# Patient Record
Sex: FEMALE | Race: WHITE | URBAN - METROPOLITAN AREA
[De-identification: names, ages, dates, MRNs, and addresses within clinical notes are randomized per-mention and may not be internally consistent; named-entity substitution may affect disease eponyms.]

---

## 2017-03-23 ENCOUNTER — TELEPHONE (OUTPATIENT)
Dept: FAMILY MEDICINE CLINIC | Age: 52
End: 2017-03-23

## 2017-03-27 ENCOUNTER — TELEPHONE (OUTPATIENT)
Dept: FAMILY MEDICINE CLINIC | Age: 52
End: 2017-03-27

## 2017-03-27 RX ORDER — NALTREXONE HYDROCHLORIDE 50 MG/1
1 TABLET, FILM COATED ORAL DAILY
Refills: 9 | COMMUNITY
Start: 2017-02-09 | End: 2017-03-28 | Stop reason: SDUPTHER

## 2017-03-28 ENCOUNTER — OFFICE VISIT (OUTPATIENT)
Dept: FAMILY MEDICINE CLINIC | Age: 52
End: 2017-03-28

## 2017-03-28 VITALS — WEIGHT: 168.4 LBS | BODY MASS INDEX: 26.77 KG/M2 | TEMPERATURE: 97.8 F

## 2017-03-28 DIAGNOSIS — Z23 NEED FOR TDAP VACCINATION: ICD-10-CM

## 2017-03-28 DIAGNOSIS — Z71.84 COUNSELING ABOUT TRAVEL: ICD-10-CM

## 2017-03-28 DIAGNOSIS — E55.9 VITAMIN D DEFICIENCY: Primary | ICD-10-CM

## 2017-03-28 DIAGNOSIS — Z23 NEED FOR HEPATITIS A IMMUNIZATION: ICD-10-CM

## 2017-03-28 PROCEDURE — 90471 IMMUNIZATION ADMIN: CPT | Performed by: FAMILY MEDICINE

## 2017-03-28 PROCEDURE — 90472 IMMUNIZATION ADMIN EACH ADD: CPT | Performed by: FAMILY MEDICINE

## 2017-03-28 PROCEDURE — 90715 TDAP VACCINE 7 YRS/> IM: CPT | Performed by: FAMILY MEDICINE

## 2017-03-28 PROCEDURE — 99213 OFFICE O/P EST LOW 20 MIN: CPT | Performed by: FAMILY MEDICINE

## 2017-03-28 PROCEDURE — 90632 HEPA VACCINE ADULT IM: CPT | Performed by: FAMILY MEDICINE

## 2017-03-28 RX ORDER — OSELTAMIVIR PHOSPHATE 75 MG/1
75 CAPSULE ORAL 2 TIMES DAILY
Qty: 10 CAPSULE | Refills: 0 | Status: SHIPPED | OUTPATIENT
Start: 2017-03-28 | End: 2017-04-02

## 2017-03-28 RX ORDER — CIPROFLOXACIN 500 MG/1
500 TABLET, FILM COATED ORAL 2 TIMES DAILY
Qty: 14 TABLET | Refills: 0 | Status: SHIPPED | OUTPATIENT
Start: 2017-03-28 | End: 2017-04-04

## 2017-03-28 RX ORDER — ZOLPIDEM TARTRATE 10 MG/1
10 TABLET ORAL NIGHTLY PRN
Qty: 14 TABLET | Refills: 0 | Status: SHIPPED | OUTPATIENT
Start: 2017-03-28 | End: 2017-08-03 | Stop reason: ALTCHOICE

## 2017-03-28 RX ORDER — DIPHENOXYLATE HYDROCHLORIDE AND ATROPINE SULFATE 2.5; .025 MG/1; MG/1
1 TABLET ORAL 4 TIMES DAILY PRN
Qty: 30 TABLET | Refills: 0 | Status: SHIPPED | OUTPATIENT
Start: 2017-03-28 | End: 2017-08-03 | Stop reason: ALTCHOICE

## 2017-03-28 RX ORDER — ZOLPIDEM TARTRATE 10 MG/1
10 TABLET ORAL NIGHTLY PRN
Qty: 14 TABLET | Refills: 0 | Status: SHIPPED | OUTPATIENT
Start: 2017-03-28 | End: 2017-03-28 | Stop reason: SDUPTHER

## 2017-03-28 ASSESSMENT — ENCOUNTER SYMPTOMS
RESPIRATORY NEGATIVE: 1
GASTROINTESTINAL NEGATIVE: 1
EYES NEGATIVE: 1

## 2017-06-27 ENCOUNTER — TELEPHONE (OUTPATIENT)
Dept: FAMILY MEDICINE CLINIC | Age: 52
End: 2017-06-27

## 2017-06-27 DIAGNOSIS — S86.899A SHIN SPLINTS, UNSPECIFIED LATERALITY, INITIAL ENCOUNTER: Primary | ICD-10-CM

## 2017-08-03 ENCOUNTER — OFFICE VISIT (OUTPATIENT)
Dept: FAMILY MEDICINE CLINIC | Age: 52
End: 2017-08-03

## 2017-08-03 VITALS
HEIGHT: 67 IN | WEIGHT: 174.8 LBS | HEART RATE: 81 BPM | DIASTOLIC BLOOD PRESSURE: 80 MMHG | OXYGEN SATURATION: 100 % | TEMPERATURE: 98 F | SYSTOLIC BLOOD PRESSURE: 110 MMHG | BODY MASS INDEX: 27.44 KG/M2

## 2017-08-03 DIAGNOSIS — L57.0 AK (ACTINIC KERATOSIS): Primary | ICD-10-CM

## 2017-08-03 DIAGNOSIS — R20.8 OTHER DISTURBANCES OF SKIN SENSATION: ICD-10-CM

## 2017-08-03 PROCEDURE — 99213 OFFICE O/P EST LOW 20 MIN: CPT | Performed by: NURSE PRACTITIONER

## 2017-08-03 PROCEDURE — 17110 DESTRUCTION B9 LES UP TO 14: CPT | Performed by: NURSE PRACTITIONER

## 2017-08-03 RX ORDER — ESTRADIOL AND NORETHINDRONE ACETATE 1; .5 MG/1; MG/1
1 TABLET ORAL
COMMUNITY
Start: 2017-05-04 | End: 2017-08-22 | Stop reason: ALTCHOICE

## 2017-08-03 ASSESSMENT — ENCOUNTER SYMPTOMS
EYES NEGATIVE: 1
GASTROINTESTINAL NEGATIVE: 1
ALLERGIC/IMMUNOLOGIC NEGATIVE: 1
RESPIRATORY NEGATIVE: 1

## 2017-08-22 ENCOUNTER — HOSPITAL ENCOUNTER (OUTPATIENT)
Dept: OTHER | Age: 52
Discharge: HOME | End: 2017-08-22
Attending: FAMILY MEDICINE | Admitting: FAMILY MEDICINE

## 2017-08-22 ENCOUNTER — OFFICE VISIT (OUTPATIENT)
Dept: FAMILY MEDICINE CLINIC | Age: 52
End: 2017-08-22

## 2017-08-22 VITALS
BODY MASS INDEX: 27.5 KG/M2 | TEMPERATURE: 97.8 F | SYSTOLIC BLOOD PRESSURE: 106 MMHG | WEIGHT: 175.2 LBS | HEIGHT: 67 IN | DIASTOLIC BLOOD PRESSURE: 74 MMHG

## 2017-08-22 DIAGNOSIS — S90.121A CONTUSION OF LESSER TOE OF RIGHT FOOT WITHOUT DAMAGE TO NAIL, INITIAL ENCOUNTER: ICD-10-CM

## 2017-08-22 DIAGNOSIS — S90.121A CONTUSION OF LESSER TOE OF RIGHT FOOT WITHOUT DAMAGE TO NAIL, INITIAL ENCOUNTER: Primary | ICD-10-CM

## 2017-08-22 PROCEDURE — 99213 OFFICE O/P EST LOW 20 MIN: CPT | Performed by: FAMILY MEDICINE

## 2017-08-22 RX ORDER — ESTRADIOL AND NORETHINDRONE ACETATE .5; .1 MG/1; MG/1
TABLET ORAL
COMMUNITY

## 2017-08-22 ASSESSMENT — ENCOUNTER SYMPTOMS
EYES NEGATIVE: 1
RESPIRATORY NEGATIVE: 1
GASTROINTESTINAL NEGATIVE: 1

## 2017-09-15 ENCOUNTER — TELEPHONE (OUTPATIENT)
Dept: FAMILY MEDICINE CLINIC | Age: 52
End: 2017-09-15

## 2017-10-03 ENCOUNTER — TELEPHONE (OUTPATIENT)
Dept: FAMILY MEDICINE CLINIC | Age: 52
End: 2017-10-03

## 2018-06-28 ENCOUNTER — TELEPHONE (OUTPATIENT)
Dept: DERMATOLOGY | Age: 53
End: 2018-06-28

## 2018-07-26 ENCOUNTER — OFFICE VISIT (OUTPATIENT)
Dept: DERMATOLOGY | Age: 53
End: 2018-07-26

## 2018-07-26 DIAGNOSIS — L57.0 AK (ACTINIC KERATOSIS): ICD-10-CM

## 2018-07-26 DIAGNOSIS — B36.0 TINEA VERSICOLOR: ICD-10-CM

## 2018-07-26 DIAGNOSIS — D48.5 NEOPLASM OF UNCERTAIN BEHAVIOR OF SKIN: Primary | ICD-10-CM

## 2018-07-26 PROCEDURE — 11100 PR BIOPSY OF SKIN LESION: CPT | Performed by: DERMATOLOGY

## 2018-07-26 PROCEDURE — 99202 OFFICE O/P NEW SF 15 MIN: CPT | Performed by: DERMATOLOGY

## 2018-07-26 PROCEDURE — 11101 PR BIOPSY, EACH ADDED LESION: CPT | Performed by: DERMATOLOGY

## 2018-07-26 RX ORDER — KETOCONAZOLE 20 MG/ML
SHAMPOO TOPICAL
Qty: 1 BOTTLE | Refills: 3 | Status: SHIPPED | OUTPATIENT
Start: 2018-07-26 | End: 2021-05-06

## 2018-07-26 NOTE — PROGRESS NOTES
[facility]  Zander Caceres MD  [phone]      [facility]  1965    45 y.o. female     Date of Visit: 7/26/2018    Chief Complaint: skin lesions, rash    History of Present Illness:    1. She presents today for a persistent pink spot on the right lower cheek - present for few years. Treated with cryotherapy in the past.      2.  She few asymptomatic scaly lesions on the face. 3   She complains of discoloration on the back.  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    Plays tennis, runs. Review of Systems:  Skin: No new or changing moles. Past Medical History, Family History, Surgical History, Medications and Allergies reviewed. Past Medical History:   Diagnosis Date    Chicken pox     Iritis     Multiple sclerosis ([facility] [address]     Pap smear for cervical cancer screening 05/04/2017    Screening mammogram, encounter for 01/17/2017     Past Surgical History:   Procedure Laterality Date    WISDOM TOOTH EXTRACTION         No Known Allergies  Outpatient Prescriptions Marked as Taking for the 7/26/18 encounter (Office Visit) with Gala Galvan MD   Medication Sig Dispense Refill    ketoconazole (NIZORAL) 2 % shampoo Use to wash the back once weekly. Leave on skin for 5 minutes prior to rinsing. 1 Bottle 3    NALTREXONE HCL PO Take 4 mg by mouth           Physical Examination     She declines a full skin exam.     The following were examined and determined to be normal: Psych/Neuro, Conjunctivae/eyelids, Gums/teeth/lips, Neck, RUE and LUE. The following were examined and determined to be abnormal: Head/face and Back. Well-appearing. 1.  A.  Right lower cheek with a 1 cm telangiectatic pearly pink plaque. B. Left lateral cheek with a 5 mm telangiectatic pearly papule. 2.  Central forehead and right medial cheek with 2 skin colored to faintly pink slightly scaly macules. 3.  Back with mildly scaly hypopigmented patches.         Assessment

## 2018-07-31 ENCOUNTER — TELEPHONE (OUTPATIENT)
Dept: DERMATOLOGY | Age: 53
End: 2018-07-31

## 2018-07-31 DIAGNOSIS — C44.319 BASAL CELL CARCINOMA OF CHEEK: Primary | ICD-10-CM

## 2018-07-31 NOTE — TELEPHONE ENCOUNTER
Reviewed results of the biopsy with the patient. Date of biopsy: 7/26/18  Site of biopsy: R lower cheek  Result: NBCC    Plan: MOHS with Dr. Merry Heath    Date of biopsy: 7/26/18  Site of biopsy: L lateral cheek  Result: NBCC    Plan: MOHS with Dr. Merry Heath    The patient expressed understanding of the plan.

## 2018-08-20 ENCOUNTER — PROCEDURE VISIT (OUTPATIENT)
Dept: SURGERY | Age: 53
End: 2018-08-20

## 2018-08-20 VITALS
DIASTOLIC BLOOD PRESSURE: 73 MMHG | TEMPERATURE: 98.3 F | OXYGEN SATURATION: 97 % | SYSTOLIC BLOOD PRESSURE: 124 MMHG | BODY MASS INDEX: 27.25 KG/M2 | HEART RATE: 64 BPM | WEIGHT: 174 LBS

## 2018-08-20 DIAGNOSIS — C44.319 BASAL CELL CARCINOMA OF LEFT CHEEK: ICD-10-CM

## 2018-08-20 DIAGNOSIS — C44.319 BASAL CELL CARCINOMA OF RIGHT CHEEK: Primary | ICD-10-CM

## 2018-08-20 PROCEDURE — 13132 CMPLX RPR F/C/C/M/N/AX/G/H/F: CPT | Performed by: DERMATOLOGY

## 2018-08-20 PROCEDURE — 17311 MOHS 1 STAGE H/N/HF/G: CPT | Performed by: DERMATOLOGY

## 2018-08-20 PROCEDURE — 12051 INTMD RPR FACE/MM 2.5 CM/<: CPT | Performed by: DERMATOLOGY

## 2018-08-20 PROCEDURE — 17312 MOHS ADDL STAGE: CPT | Performed by: DERMATOLOGY

## 2018-08-20 NOTE — PROGRESS NOTES
MOHS PROCEDURE NOTE    PHYSICIAN:  Palmer Garcia. Efrem Monroe MD    ASSISTANT: Domenico Gaston RN and Gunner Marques[address]     REFERRING PROVIDER:   Huma Quinn MD    PREOPERATIVE DIAGNOSIS: Nodular Basal Cell Carcinoma     SPECIFIC MOHS INDICATIONS:  location    [facility] SCORIN/9    POSTOPERATIVE DIAGNOSIS: SAME    LOCATION: Left lateral cheek    OPERATIVE PROCEDURE:  MOHS MICROGRAPHIC SURGERY    RECONSTRUCTION OF DEFECT: Intermediate layered closure    PREOPERATIVE SIZE: 6x5 MM    DEFECT SIZE: 21x9 MM    LENGTH OF REPAIRED WOUND/SIZE OF FLAP/SIZE OF GRAFT:  24 MM    ANESTHESIA:  8mL 1% lidocaine with epinephrine 1:100,000 buffered. EBL:  MINIMAL    DURATION OF PROCEDURE:  2 HOURS    POSTOPERATIVE OBSERVATION: 1 HOUR    SPECIMENS:  SEE MOHS MAP    COMPLICATIONS:  NONE    DESCRIPTION OF PROCEDURE:  The patient was given a mirror, as appropriate, and the biopsy site was identified, marked with a surgical marking pen, and verified by the patient. Options for treatment were discussed and the patient was informed that Mohs surgery was the selected treatment based on its lower recurrence rate, given the features listed above, as compared to other treatment modalities such as excision, radiation, or curettage, and agreed with this treatment plan. Risks and benefits including bruising, swelling, bleeding, infection, nerve injury, recurrence, and scarring were discussed with the patient prior to the procedure and a written consent detailing these and other risks was reviewed with the patient and signed. There was a time out for person and procedure verification. The surgical site was prepped with an antiseptic solution. Application of an antiseptic solution was repeated before each surgical stage. Stage I:  The clinically-apparent tumor was carefully defined and debulked, determining the edge of the surgical excision.     A thin layer of tumor-laden tissue was excised with a narrow margin of normal-appearing skin, using the technique of Mohs. A map was prepared to correspond to the area of skin from which it was excised. Hemostasis was achieved using electrosurgery. The wound was bandaged. The tissue was prepared for the cryostat and sectioned. 1 section(s) prepared. Each section was coded, cut, and stained for microscopic examination. The entire base and margins of the excised piece of tissue were examined by the surgeon. No tumor was identified at the peripheral margins of stage I of microscopically controlled surgery. DEFECT MANAGEMENT:    REPAIR DESCRIPTION:  Various closure modalities were discussed with the patient, and it was decided that an intermediate layered repair would best preserve normal anatomic and functional relationships. Additional risk of wound dehiscence was discussed. The area was anesthetized with 1% lidocaine with epinephrine 1:100,000 buffered, was given a sterile prep using Chlorhexidine gluconate 4% solution and draped in the usual sterile fashion. Recreation and enlargement of the wound was performed by excising cones of tissue via the triangulation technique. The final incision lines were placed with respect for the patient's natural skin tension lines in a linear configuration to avoid functional and aesthetic distortion of adjacent free margins. Following minimal undermining, meticulous hemostasis was obtained with spot monopolar electrocoagulation. Subcutaneous dead space and dermis were closed using 5-0 Vicryl buried subcutaneous interrupted suture and the epidermis was approximated with 6-0 Fast absorbing gut running epidermal sutures. WOUND COVERAGE:  The wound was cleansed with normal saline and dried. Liquid skin adhesive was applied for wound protection and additional epidermal adhesion. The patient was given detailed oral and written information on post-operative care. There were no complications.   The patient left the Unit in good medical condition. FOLLOW-UP:  As dissolving sutures were placed, the patient was asked to return if any questions or concerns arose, but otherwise will return to see general dermatology per their instructions.

## 2018-08-20 NOTE — PATIENT INSTRUCTIONS
[facility] Office Hours:    Monday-Thursday  7:30 AM-4:30 PM    Friday  9:00 AM-3:00 PM    The patient was given post operative care instructions Liquid skin adhesive. All questions were reviewed.

## 2018-08-20 NOTE — PROGRESS NOTES
MOHS PROCEDURE NOTE    PHYSICIAN:  Moreno Dodge. Susi Cee MD    ASSISTANT: Jeramy Mcmillan RN and Sukumar Weaver     REFERRING PROVIDER:   Lacy Swift MD    PREOPERATIVE DIAGNOSIS: Nodular Basal Cell Carcinoma     SPECIFIC MOHS INDICATIONS:  location    [facility] SCORIN/9    POSTOPERATIVE DIAGNOSIS: SAME    LOCATION: Right lower cheek    OPERATIVE PROCEDURE:  MOHS MICROGRAPHIC SURGERY    RECONSTRUCTION OF DEFECT: Complex layered closure    PREOPERATIVE SIZE: 18x9 MM    DEFECT SIZE: 21x20 MM    LENGTH OF REPAIRED WOUND/SIZE OF FLAP/SIZE OF GRAFT:  47 MM    ANESTHESIA:  9.5mL 1% lidocaine with epinephrine 1:100,000 buffered. EBL:  MINIMAL    DURATION OF PROCEDURE:  2 HOURS    POSTOPERATIVE OBSERVATION: 1 HOUR    SPECIMENS:  SEE MOHS MAP    COMPLICATIONS:  NONE    DESCRIPTION OF PROCEDURE:  The patient was given a mirror, as appropriate, and the biopsy site was identified, marked with a surgical marking pen, and verified by the patient. Options for treatment were discussed and the patient was informed that Mohs surgery was the selected treatment based on its lower recurrence rate, given the features listed above, as compared to other treatment modalities such as excision, radiation, or curettage, and agreed with this treatment plan. Risks and benefits including bruising, swelling, bleeding, infection, nerve injury, recurrence, and scarring were discussed with the patient prior to the procedure and a written consent detailing these and other risks was reviewed with the patient and signed. There was a time out for person and procedure verification. The surgical site was prepped with an antiseptic solution. Application of an antiseptic solution was repeated before each surgical stage. Stage I:  The clinically-apparent tumor was carefully defined and debulked, determining the edge of the surgical excision.     A thin layer of tumor-laden tissue was excised with a narrow margin of normal-appearing skin,

## 2018-08-21 ENCOUNTER — TELEPHONE (OUTPATIENT)
Dept: SURGERY | Age: 53
End: 2018-08-21

## 2018-08-27 ENCOUNTER — TELEPHONE (OUTPATIENT)
Dept: SURGERY | Age: 53
End: 2018-08-27

## 2018-08-27 NOTE — TELEPHONE ENCOUNTER
Spoke with patient and she wanted to discuss what to do after her one week post Mohs on her right cheek. Reviewed that she may clean the area gently and the glue will begin to dissolve. She questions the elevation of the scar and discussed massaging after 2 weeks post Mohs. She stated she is flying next week and if there are any precautions. Informed her there are none but she stated she will feel better wearing a bandage. No indication of infection. No further concerns at this moment.

## 2019-07-08 ENCOUNTER — OFFICE VISIT (OUTPATIENT)
Dept: DERMATOLOGY | Age: 54
End: 2019-07-08
Payer: COMMERCIAL

## 2019-07-08 DIAGNOSIS — D48.5 NEOPLASM OF UNCERTAIN BEHAVIOR OF SKIN: Primary | ICD-10-CM

## 2019-07-08 DIAGNOSIS — L57.0 AK (ACTINIC KERATOSIS): ICD-10-CM

## 2019-07-08 PROCEDURE — 11102 TANGNTL BX SKIN SINGLE LES: CPT | Performed by: DERMATOLOGY

## 2019-07-08 PROCEDURE — 17000 DESTRUCT PREMALG LESION: CPT | Performed by: DERMATOLOGY

## 2019-07-08 PROCEDURE — 11103 TANGNTL BX SKIN EA SEP/ADDL: CPT | Performed by: DERMATOLOGY

## 2019-07-08 NOTE — PROGRESS NOTES
and left cheek - few scaly skin colored to pink macules. Assessment and Plan     1. Neoplasm of uncertain behavior of skin,   A. Left inferolateral cheek - r/o BCC  B. Right upper back - r/o BCC    Discussed possible diagnosis; patient agreeable to biopsies (verbal consent obtained). The area(s) to be biopsied were marked with a surgical pen. Alcohol was used to cleanse the site. Local anesthesia was acheived with 1% lidocaine with epinephrine. Shave biopsy was performed x 2 using a razor blade. Hemostasis was achieved with aluminum chloride. The wound(s) were dressed with petrolatum and covered with a bandage. Wound care instructions were reviewed. 2 Specimen (s) sent to pathology. The specimen bottles were appropriately labeled. We also reviewed the risks of bleeding, scar, and infection. 2. AK (actinic keratosis) -     Observe small asymptomatic nasal lesion-discussed low premalignant potential.  2 cycles of liquid nitrogen applied to 1 AK on the left malar cheek. Patient was educated regarding the potential risks of blister formation, discomfort, hypopigmentation, and scar. Wound care was discussed. Return in about 6 months (around 1/8/2020).

## 2019-07-11 DIAGNOSIS — C44.319 BASAL CELL CARCINOMA (BCC) OF LEFT CHEEK: Primary | ICD-10-CM

## 2019-07-11 LAB — DERMATOLOGY PATHOLOGY REPORT: ABNORMAL

## 2019-07-19 ENCOUNTER — PROCEDURE VISIT (OUTPATIENT)
Dept: DERMATOLOGY | Age: 54
End: 2019-07-19
Payer: COMMERCIAL

## 2019-07-19 DIAGNOSIS — C44.519 BCC (BASAL CELL CARCINOMA), TRUNK: Primary | ICD-10-CM

## 2019-07-19 PROCEDURE — 17262 DSTRJ MAL LES T/A/L 1.1-2.0: CPT | Performed by: DERMATOLOGY

## 2019-08-13 ENCOUNTER — PROCEDURE VISIT (OUTPATIENT)
Dept: SURGERY | Age: 54
End: 2019-08-13
Payer: COMMERCIAL

## 2019-08-13 VITALS
BODY MASS INDEX: 26.94 KG/M2 | HEART RATE: 67 BPM | SYSTOLIC BLOOD PRESSURE: 90 MMHG | WEIGHT: 172 LBS | DIASTOLIC BLOOD PRESSURE: 51 MMHG | TEMPERATURE: 98.6 F | OXYGEN SATURATION: 98 %

## 2019-08-13 DIAGNOSIS — C44.319 BASAL CELL CARCINOMA OF LEFT CHEEK: Primary | ICD-10-CM

## 2019-08-13 PROCEDURE — 12052 INTMD RPR FACE/MM 2.6-5.0 CM: CPT | Performed by: DERMATOLOGY

## 2019-08-13 PROCEDURE — 17311 MOHS 1 STAGE H/N/HF/G: CPT | Performed by: DERMATOLOGY

## 2019-08-13 NOTE — PROGRESS NOTES
MOHS PROCEDURE NOTE    PHYSICIAN:  Jackie Mari. Becca Vaughan MD    ASSISTANT: Michelle Nassar MA and Nisa Washington RN     REFERRING PROVIDER:   Miguel Magallanes MD    PREOPERATIVE DIAGNOSIS: Nodular Basal Cell Carcinoma     SPECIFIC MOHS INDICATIONS:  location    [facility] SCORIN/9    POSTOPERATIVE DIAGNOSIS: SAME    LOCATION: Left inferior lateral cheek    OPERATIVE PROCEDURE:  MOHS MICROGRAPHIC SURGERY    RECONSTRUCTION OF DEFECT: Intermediate layered closure    PREOPERATIVE SIZE: 13x6 MM    DEFECT SIZE: 16x10 MM    LENGTH OF REPAIRED WOUND/SIZE OF FLAP/SIZE OF GRAFT:  33 MM    ANESTHESIA:  8mL 1% lidocaine with epinephrine 1:100,000 buffered. EBL:  MINIMAL    DURATION OF PROCEDURE:  1 HOURS    POSTOPERATIVE OBSERVATION: 1 HOUR    SPECIMENS:  SEE MOHS MAP    COMPLICATIONS:  NONE    DESCRIPTION OF PROCEDURE:  The patient was given a mirror, as appropriate, and the biopsy site was identified, marked with a surgical marking pen, and verified by the patient. Options for treatment were discussed and the patient was informed that Mohs surgery was the selected treatment based on its lower recurrence rate, given the features listed above, as compared to other treatment modalities such as excision, radiation, or curettage, and agreed with this treatment plan. Risks and benefits including bruising, swelling, bleeding, infection, nerve injury, recurrence, and scarring were discussed with the patient prior to the procedure and a written consent detailing these and other risks was reviewed with the patient and signed. There was a time out for person and procedure verification. The surgical site was prepped with an antiseptic solution. Application of an antiseptic solution was repeated before each surgical stage. Stage I:  The clinically-apparent tumor was carefully defined and debulked, determining the edge of the surgical excision.     A thin layer of tumor-laden tissue was excised with a narrow margin of normal-appearing

## 2019-08-13 NOTE — PATIENT INSTRUCTIONS
Excedrin) for 48 hours  after your procedure. Bleeding: If bleeding occurs, DO NOT remove the bandage. Put firm pressure on the area with gauze for 20 minutes without peeking. If the bleeding continue, apply pressure for another 20 minutes. If the bleeding does not stop after you apply pressure, call us right away. If you can not call, go to the nearest emergency room or urgent care facility. What to expect:  You may have these symptoms. They are normal and should get better with time:  1. Swelling. Swelling usually increases for the first 48 hours after your procedure and then begins to improve. Some soreness and redness around your wound. If we worked close to you eyes  (forehead, nose, temple, or upper cheeks) your eyes may become swollen and/ or black and blue. 2. Bruising, which could last 1 week or more. 3. Pink and bumpy appearance to the scar. This may happen a few weeks after your procedure. After 4 weeks, you may gently massage the area each day with facial moisturizer or petroleum jelly (Vaseline or Aquaphor). This will help to smooth the skin and improve the appearance of the scar. The color of your scar will fade over time, but may be pink for several months after the procedure. The scar may take 6 months to 1 year to reach its final color and appearance. 4. \"Spitting\" suture. Occasionally, an inside suture (stitch) does not completely dissolve. When this happens, (generally 4-8 weeks after surgery), it causes a bump or \"pimple\" to form on the scar. This is easily removed and is not at all serious. It does not mean the skin cancer has returned. Contact us if it happens, but do not be alarmed. Vitamin E oil is NOT necessary. A good moisturizer is just as effective. Sunscreen IS necessary.  Use at least and SPF 30 sunscreen daily- even in winter    Call us at [phone] right away if you have any of the following symptoms:  -Bleeding that you can not stop (see highlighted area above)

## 2019-08-14 ENCOUNTER — TELEPHONE (OUTPATIENT)
Dept: SURGERY | Age: 54
End: 2019-08-14

## 2020-11-04 ENCOUNTER — OFFICE VISIT (OUTPATIENT)
Dept: DERMATOLOGY | Age: 55
End: 2020-11-04
Payer: COMMERCIAL

## 2020-11-04 VITALS — TEMPERATURE: 97.5 F

## 2020-11-04 PROCEDURE — 3017F COLORECTAL CA SCREEN DOC REV: CPT | Performed by: DERMATOLOGY

## 2020-11-04 PROCEDURE — G8484 FLU IMMUNIZE NO ADMIN: HCPCS | Performed by: DERMATOLOGY

## 2020-11-04 PROCEDURE — G8427 DOCREV CUR MEDS BY ELIG CLIN: HCPCS | Performed by: DERMATOLOGY

## 2020-11-04 PROCEDURE — G8421 BMI NOT CALCULATED: HCPCS | Performed by: DERMATOLOGY

## 2020-11-04 PROCEDURE — 99213 OFFICE O/P EST LOW 20 MIN: CPT | Performed by: DERMATOLOGY

## 2020-11-04 PROCEDURE — 1036F TOBACCO NON-USER: CPT | Performed by: DERMATOLOGY

## 2020-11-04 NOTE — PROGRESS NOTES
[facility]ermat[facility]  Shahzad Espinal MD  [phone]      Sharad Mendez  1965    60 y.o. female     Date of Visit: 11/4/2020    Chief Complaint: skin lesions    History of Present Illness:    1. She has several asymptomatic pigmented lesions on the cheeks. 2.  F/u for a hx of BCCs - denies any signs of recurrence. Nodular BCC of the left inferior lateral cheek-treated with Mohs by Dr. Guido Segovia on 8/13/2019. Superficial BCC on the right upper back-treated with curettage on 7/19/2019. Nodular BCC on the left lateral cheek-treated with Mohs by Dr. Guido Segovia on 8/20/2018. Nodular BCC on the right lower cheek-treated with Mohs by Dr. Guido Segovia on 8/20/2018.      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     Review of Systems:  Skin: No new or changing moles. Past Medical History, Family History, Surgical History, Medications and Allergies reviewed. Past Medical History:   Diagnosis Date    Chicken pox     Iritis     Multiple sclerosis ([facility] [address]     Pap smear for cervical cancer screening 05/04/2017    Screening mammogram, encounter for 01/17/2017     Past Surgical History:   Procedure Laterality Date    MOHS SURGERY  08/20/2018    R lower cheek and L lateral cheek    WISDOM TOOTH EXTRACTION         No Known Allergies  Outpatient Medications Marked as Taking for the 11/4/20 encounter (Office Visit) with Casi Mcintosh MD   Medication Sig Dispense Refill    Estradiol-Norethindrone Acet (ACTIVELLA) 0.5-0.1 MG TABS Take by mouth      NALTREXONE HCL PO Take 4 mg by mouth         Physical Examination       The following were examined and determined to be normal: Psych/Neuro, Scalp/hair, Head/face, Conjunctivae/eyelids, Gums/teeth/lips, Neck, Breast/axilla/chest, Abdomen, Back, RUE and LUE. The following were examined and determined to be abnormal: None. Well appearing. 1.  Lateral portions of the face - stuck on appearing waxy appearing brown papules. 2.  Clear. Assessment and Plan     1. SK (seborrheic keratosis)     Reassurance. 2. History of basal cell carcinomas - clear    Sun protective behaviors and self skin examinations were encouraged. Call for any new or concerning lesions. Return in about 6 months (around 5/4/2021).     --Casi Mcintosh MD

## 2021-05-06 ENCOUNTER — OFFICE VISIT (OUTPATIENT)
Dept: DERMATOLOGY | Age: 56
End: 2021-05-06
Payer: COMMERCIAL

## 2021-05-06 VITALS — TEMPERATURE: 97.3 F

## 2021-05-06 DIAGNOSIS — L82.1 SK (SEBORRHEIC KERATOSIS): Primary | ICD-10-CM

## 2021-05-06 DIAGNOSIS — L81.4 SOLAR LENTIGO: ICD-10-CM

## 2021-05-06 DIAGNOSIS — Z85.828 HISTORY OF BASAL CELL CARCINOMA: ICD-10-CM

## 2021-05-06 DIAGNOSIS — D22.9 MULTIPLE NEVI: ICD-10-CM

## 2021-05-06 PROCEDURE — G8427 DOCREV CUR MEDS BY ELIG CLIN: HCPCS | Performed by: DERMATOLOGY

## 2021-05-06 PROCEDURE — G8421 BMI NOT CALCULATED: HCPCS | Performed by: DERMATOLOGY

## 2021-05-06 PROCEDURE — 3017F COLORECTAL CA SCREEN DOC REV: CPT | Performed by: DERMATOLOGY

## 2021-05-06 PROCEDURE — 99213 OFFICE O/P EST LOW 20 MIN: CPT | Performed by: DERMATOLOGY

## 2021-05-06 PROCEDURE — 1036F TOBACCO NON-USER: CPT | Performed by: DERMATOLOGY

## 2021-05-06 NOTE — PROGRESS NOTES
[facility]  Jennifer Brunson MD  [phone]      Verner Erb  1965    60 y.o. female     Date of Visit: 5/6/2021    Chief Complaint: skin lesions    History of Present Illness:    1. She has several stable pigmented lesions on the lateral portions of her cheeks. 2.  She also has multiple freckles on the chest and extremities. She is not aware of any concerning changes. 3.  She has multiple moles on the trunk and extremities-not aware of any changes in size, color, or shape. 4.  She has a history of multiple basal cell carcinomas-denies any signs of recurrence. Nodular BCC of the left inferior lateral cheek-treated with Mohs by Dr. Suzy Buitrago on 8/13/2019. Superficial BCC on the right upper back-treated with curettage on 7/19/2019. Nodular BCC on the left lateral cheek-treated with Mohs by Dr. Suzy Buitrago on 8/20/2018. Nodular BCC on the right lower cheek-treated with Mohs by Dr. Suzy Buitrago on 8/20/2018.      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       Review of Systems:  Gen: Feels well, good sense of health. Past Medical History, Family History, Surgical History, Medications and Allergies reviewed.     Past Medical History:   Diagnosis Date    Chicken pox     Iritis     Multiple sclerosis ([facility] [address]     Pap smear for cervical cancer screening 05/04/2017    Screening mammogram, encounter for 01/17/2017     Past Surgical History:   Procedure Laterality Date    MOHS SURGERY  08/20/2018    R lower cheek and L lateral cheek    WISDOM TOOTH EXTRACTION         No Known Allergies  Outpatient Medications Marked as Taking for the 5/6/21 encounter (Office Visit) with Jessica Ackerman MD   Medication Sig Dispense Refill    NALTREXONE HCL PO Take 4 mg by mouth           Physical Examination       The following were examined and determined to be normal: Psych/Neuro, Scalp/hair, Head/face, Conjunctivae/eyelids, Gums/teeth/lips, Neck, Breast/axilla/chest, Abdomen, Back, RUE, LUE, RLE, LLE and Nails/digits. The following were examined and determined to be abnormal: None. Well appearing. 1.  Lateral cheeks with few waxy brown papules. 2.  Chest and extremities with multiple smooth brown macules and patches. 3.  Trunk and extremities with multiple well defined round to oval smooth brown macules and papules. 4.  Clear. Assessment and Plan     1. SK (seborrheic keratosis)    Reassurance. 2. Solar lentigines    Monitor for change. Sun protective behaviors, including use of at least SPF 30 sunscreen, and self skin examinations were encouraged. Call for any new or concerning lesions. 3. Multiple nevi - benign appearing    Sun protective behaviors, including use of at least SPF 30 sunscreen, and self skin examinations were encouraged. Call for any new or concerning lesions. 4. History of basal cell carcinomas - clear    Sun protective behaviors, including use of at least SPF 30 sunscreen, and self skin examinations were encouraged. Call for any new or concerning lesions. Return in about 1 year (around 5/6/2022).     --Namrata Torre MD

## 2022-06-07 ENCOUNTER — TELEPHONE (OUTPATIENT)
Dept: DERMATOLOGY | Age: 57
End: 2022-06-07

## 2022-06-07 NOTE — TELEPHONE ENCOUNTER
Patient's  was in for an appointment with Dr Gricelda Ortega and said patient was in need of an appointment for a yearly skin check and a toenail issue. Called and left message for patient to call back office. Please schedule for next available.

## 2024-07-03 ENCOUNTER — OFFICE VISIT (OUTPATIENT)
Dept: DERMATOLOGY | Age: 59
End: 2024-07-03
Payer: COMMERCIAL

## 2024-07-03 DIAGNOSIS — L57.0 ACTINIC KERATOSIS: Primary | ICD-10-CM

## 2024-07-03 DIAGNOSIS — D48.5 NEOPLASM OF UNCERTAIN BEHAVIOR OF SKIN: ICD-10-CM

## 2024-07-03 PROCEDURE — 17003 DESTRUCT PREMALG LES 2-14: CPT | Performed by: DERMATOLOGY

## 2024-07-03 PROCEDURE — 11102 TANGNTL BX SKIN SINGLE LES: CPT | Performed by: DERMATOLOGY

## 2024-07-03 PROCEDURE — 17000 DESTRUCT PREMALG LESION: CPT | Performed by: DERMATOLOGY

## 2024-07-03 PROCEDURE — 11103 TANGNTL BX SKIN EA SEP/ADDL: CPT | Performed by: DERMATOLOGY

## 2024-07-03 NOTE — PROGRESS NOTES
[facility] Dermat[facility]  Ej aMri MD  [phone]      Yris Mckinley  1965    58 y.o. female     Date of Visit: 7/3/2024    Chief Complaint: skin lesions    History of Present Illness:    1.  She returns today for history of actinic keratoses-has several new lesions on the face today.    2.  She has few persistent pink lesions on the chest and abdomen.    Dermatologic history:    Nodular BCC of the left inferior lateral cheek-treated with Mohs by Dr. Martinez on 8/13/2019.  Superficial BCC on the right upper back-treated with curettage on 7/19/2019.  Nodular BCC on the left lateral cheek-treated with Mohs by Dr. Martinez on 8/20/2018.  Nodular BCC on the right lower cheek-treated with Mohs by Dr. Martinez on 8/20/2018.      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          Review of Systems:  Gen: Feels well, good sense of health.  Skin: No new or changing moles.    Past Medical History, Family History, Surgical History, Medications and Allergies reviewed.    Past Medical History:   Diagnosis Date    Chicken pox     Iritis     Multiple sclerosis (HCC)     Pap smear for cervical cancer screening 05/04/2017    Screening mammogram, encounter for 01/17/2017     Past Surgical History:   Procedure Laterality Date    MOHS SURGERY  08/20/2018    R lower cheek and L lateral cheek    WISDOM TOOTH EXTRACTION         No Known Allergies  Outpatient Medications Marked as Taking for the 7/3/24 encounter (Office Visit) with Ej Mari MD   Medication Sig Dispense Refill    NALTREXONE HCL PO Take 4 mg by mouth             Physical Examination       Full skin examination performed except for the skin below the underwear.    Well-appearing.    1.  Forehead-3, right medial cheek-1, left medial cheek-1, left temple-1: Several keratotic erythematous macules.    2.  A.  Left medial clavicular region with a 5 mm telangiectatic pearly pink papule.  B.  Left upper chest with a 7 mm

## 2024-07-03 NOTE — PATIENT INSTRUCTIONS

## 2024-07-09 LAB — DERMATOLOGY PATHOLOGY REPORT: ABNORMAL

## 2024-08-23 ENCOUNTER — PROCEDURE VISIT (OUTPATIENT)
Dept: DERMATOLOGY | Age: 59
End: 2024-08-23

## 2024-08-23 DIAGNOSIS — C44.519 BCC (BASAL CELL CARCINOMA), ABDOMEN: ICD-10-CM

## 2024-08-23 DIAGNOSIS — C44.519 BASAL CELL CARCINOMA (BCC) OF CLAVICULAR AREA: Primary | ICD-10-CM

## 2024-08-23 DIAGNOSIS — C44.519 BCC (BASAL CELL CARCINOMA), CHEST: ICD-10-CM

## 2024-08-23 NOTE — PROGRESS NOTES
[facility]  Ej Mari MD  [phone]      Yris Mckinley  1965    58 y.o. female     Date of Visit: 8/23/2024    Chief Complaint: BCCs    History of Present Illness:    Here today for treatment of 3 basal cell carcinomas.    DIAGNOSIS     A. LEFT MEDIAL CLAVICULAR   Nodular basal cell carcinoma     B. LEFT UPPER CHEST   Nodular basal cell carcinoma     C. LEFT ABDOMEN   Superficial basal cell carcinoma     Pathologist Signature   Harriet Alvarez MD       Review of Systems:  Gen: Feels well, good sense of health.    Past Medical History, Family History, Surgical History, Medications and Allergies reviewed.    Past Medical History:   Diagnosis Date    Chicken pox     Iritis     Multiple sclerosis (HCC)     Pap smear for cervical cancer screening 05/04/2017    Screening mammogram, encounter for 01/17/2017     Past Surgical History:   Procedure Laterality Date    MOHS SURGERY  08/20/2018    R lower cheek and L lateral cheek    WISDOM TOOTH EXTRACTION         No Known Allergies  No outpatient medications have been marked as taking for the 8/23/24 encounter (Appointment) with Ej Mari MD.         Physical Examination       Well-appearing.    1.  Left medial clavicular region - clear.  Biopsy site cannot be identified despite photo taken at biopsy.    2.  Left upper chest with a 4 mm round pink macule.     3.  Left central upper abdomen - 9 mm oval shaped pink macule.        Assessment and Plan     1. Nodular basal cell carcinoma (BCC) of clavicular area - cleared with biopsy.    Observe.     2. Small nodular nodular BCC (basal cell carcinoma), left upper chest - 4 mm    We discussed the indication, risks (bleeding, discomfort, scar and recurrence) and benefits of the procedure.  The patient agreed to proceed and a consent form was signed.       The area to be treated with cleansed with alcohol and marked with surgical marking pen. Local anesthesia was acheived with 1%

## 2025-01-20 ENCOUNTER — OFFICE VISIT (OUTPATIENT)
Age: 60
End: 2025-01-20
Payer: COMMERCIAL

## 2025-01-20 DIAGNOSIS — C44.619 BASAL CELL CARCINOMA (BCC) OF LEFT FOREARM: Primary | ICD-10-CM

## 2025-01-20 DIAGNOSIS — D48.5 NEOPLASM OF UNCERTAIN BEHAVIOR OF SKIN: ICD-10-CM

## 2025-01-20 PROCEDURE — 11102 TANGNTL BX SKIN SINGLE LES: CPT | Performed by: DERMATOLOGY

## 2025-01-20 PROCEDURE — 17261 DSTRJ MAL LES T/A/L .6-1.0CM: CPT | Performed by: DERMATOLOGY

## 2025-01-20 NOTE — PATIENT INSTRUCTIONS
Biopsy Wound Care Instructions    Keep the bandage in place for 24 hours.   Cleanse the wound with mild soapy water daily  Gently dry the area.  Apply Vaseline or petroleum jelly to the wound using a cotton tipped applicator.  Cover with a clean bandage.  Repeat this process until the biopsy site is healed.  If you had stitches placed, continue treating the site until the stitches are removed. Remember to make an appointment to return to have your stitches removed by our staff.  You may shower and bathe as usual.       ** Biopsy results generally take around 7 business days to come back.  If you have not heard from us by then, please call the office at [phone].    *Please note that biopsy results are released to both the patient and physician at the same time in [place].  Please allow time for your physician to review the results.  One of our staff members will reach out to you with the results and plan.

## 2025-01-20 NOTE — PROGRESS NOTES
Assessment and Plan     1. Small superficial basal cell carcinoma (BCC) of left forearm - 6 mm    Discussed likely diagnosis; patient agreeable to shave biopsy and destructive treatment with curettage (written consent obtained). We also discussed the risks of bleeding, scar, and infection.    The area(s) to be biopsied were marked with a surgical pen.  Alcohol was used to cleanse the site. Local anesthesia was acheived with 1% lidocaine with epinephrine. Shave biopsy was performed using a razor blade followed by sharp curettage in multiple directions.  Hemostasis was achieved with aluminum chloride. The wound(s) were dressed with petrolatum and covered with a bandage.  Wound care instructions were reviewed.  1 Specimen (s) sent to pathology.  The specimen bottles were appropriately labeled.      The patient tolerated the procedure well and there were no immediate complications.      2. Neoplasm of uncertain behavior of skin, right upper abdomen-dysplastic nevus versus early melanoma    Discussed possible diagnosis; patient agreeable to proceed with skin biopsy (written consent obtained).  Risks of the procedure were reviewed including discomfort, bleeding, scar and infection.      The area to be biopsied was marked with a surgical pen.  Alcohol was used to cleanse the site. Local anesthesia was acheived with 1% lidocaine with epinephrine. Shave biopsy was performed using a razor blade.  Hemostasis was achieved with aluminum chloride. The wound was dressed with petrolatum and covered with a bandage.  Wound care instructions were reviewed.  1 Specimen sent to pathology.  The specimen bottle was appropriately labeled.      The patient tolerated the procedure well and there were no immediate complications.          Return in about 6 months (around 7/20/2025).    --Ej Mari MD

## 2025-01-23 LAB — DERMATOLOGY PATHOLOGY REPORT: ABNORMAL
